# Patient Record
Sex: MALE | Race: WHITE | Employment: UNEMPLOYED | URBAN - METROPOLITAN AREA
[De-identification: names, ages, dates, MRNs, and addresses within clinical notes are randomized per-mention and may not be internally consistent; named-entity substitution may affect disease eponyms.]

---

## 2017-06-18 ENCOUNTER — HOSPITAL ENCOUNTER (EMERGENCY)
Age: 37
Discharge: LEFT BEFORE DISCHARGE | End: 2017-06-18
Attending: EMERGENCY MEDICINE
Payer: SELF-PAY

## 2017-06-18 VITALS
TEMPERATURE: 99 F | WEIGHT: 195 LBS | HEIGHT: 73 IN | HEART RATE: 104 BPM | DIASTOLIC BLOOD PRESSURE: 76 MMHG | SYSTOLIC BLOOD PRESSURE: 117 MMHG | RESPIRATION RATE: 18 BRPM | BODY MASS INDEX: 25.84 KG/M2 | OXYGEN SATURATION: 99 %

## 2017-06-18 PROCEDURE — 75810000275 HC EMERGENCY DEPT VISIT NO LEVEL OF CARE

## 2017-06-19 NOTE — ED NOTES
Pt told  Josué he was going to his car to get a soda, pt was called to go to [facility], pt was a no show, pt was then called at again at  [id number] to go to [facility], again pt did not respond. Parking lot checked for pt.